# Patient Record
(demographics unavailable — no encounter records)

---

## 2024-11-20 NOTE — HISTORY OF PRESENT ILLNESS
[de-identified] : Patient presents today c/o recurrent ear infections. Accompanied by mother. Had 1 ear infection since visit, 2 weeks ago. He has been pulling on ears last few days. Denies fevers. Mom states that he is not speaking many words, will be evaluated by early intervention. [place] throat infections or snoring.

## 2024-11-20 NOTE — PHYSICAL EXAM
[Normal] : mucosa is normal [Midline] : trachea located in midline position [de-identified] : impacted wax in both ears cleaned with curette

## 2024-11-20 NOTE — ASSESSMENT
[FreeTextEntry1] : wax cleaned.  tympanograms type B. Persistent since september 2024.  RTC before surgery to check on fluid.

## 2024-12-04 NOTE — PHYSICAL EXAM
[Normal] : mucosa is normal [Midline] : trachea located in midline position [de-identified] : bilateral impacted wax cleaned with curette

## 2024-12-04 NOTE — REASON FOR VISIT
[Subsequent Evaluation] : a subsequent evaluation for [FreeTextEntry2] : recurrent AOM, speech delay

## 2024-12-04 NOTE — ASSESSMENT
[FreeTextEntry1] : Wax cleaned.  Bilateral type C tympanograms. After being type B in sep and nov.  Patient having a croup infection now. Still coughing.  I explained that from a guideline standpoint, the indication for tubes is there. However, given the current infection and resorption of fluid, i gave the option of rescheduling and giving the ears more time. I also discussed risks of general anesthesia while a respiratory infection is active,  Patient's mother will think about the options and will let us know.

## 2024-12-04 NOTE — HISTORY OF PRESENT ILLNESS
[FreeTextEntry1] : Patient returns today c/o recurrent AOM, speech delay. Accompanied by mother. States no ear infection, no pulling on ears. Had croup this week, was on steroid with relief of symptoms. Continues to have speech delay. Here today to check ears before surgery.

## 2025-01-10 NOTE — PHYSICAL EXAM
[Normal] : mucosa is normal [Midline] : trachea located in midline position [de-identified] : B/L ear tubes

## 2025-01-10 NOTE — HISTORY OF PRESENT ILLNESS
[FreeTextEntry1] : Patient returns today s/p bilateral myringotomy with [place] tubes 12/20/24. Accompanied by mom. Denies ear infections. Mom states that he is speaking more and hearing better.

## 2025-01-10 NOTE — REASON FOR VISIT
[Post-Operative Visit] : a post-operative visit [FreeTextEntry2] : s/p bilateral myringotomy with [place] tubes 12/10/24